# Patient Record
Sex: FEMALE | Race: WHITE | Employment: OTHER | URBAN - METROPOLITAN AREA
[De-identification: names, ages, dates, MRNs, and addresses within clinical notes are randomized per-mention and may not be internally consistent; named-entity substitution may affect disease eponyms.]

---

## 2018-05-29 ENCOUNTER — HOSPITAL ENCOUNTER (OUTPATIENT)
Age: 68
Discharge: HOME | End: 2018-05-29
Payer: COMMERCIAL

## 2018-05-29 LAB
ALBUMIN SERPL-MCNC: 4.3 G/DL (ref 3.5–5.2)
ALP BLD-CCNC: 83 U/L (ref 35–104)
ALT SERPL-CCNC: 14 U/L (ref 0–32)
ANION GAP SERPL CALCULATED.3IONS-SCNC: 9 MMOL/L (ref 7–16)
AST SERPL-CCNC: 17 U/L (ref 0–31)
BASOPHILS ABSOLUTE: 0.02 E9/L (ref 0–0.2)
BASOPHILS RELATIVE PERCENT: 0.4 % (ref 0–2)
BILIRUB SERPL-MCNC: 0.5 MG/DL (ref 0–1.2)
BUN BLDV-MCNC: 11 MG/DL (ref 8–23)
CALCIUM SERPL-MCNC: 9 MG/DL (ref 8.6–10.2)
CHLORIDE BLD-SCNC: 104 MMOL/L (ref 98–107)
CHOLESTEROL, FASTING: 206 MG/DL (ref 0–199)
CO2: 31 MMOL/L (ref 22–29)
CREAT SERPL-MCNC: 0.8 MG/DL (ref 0.5–1)
EOSINOPHILS ABSOLUTE: 0.19 E9/L (ref 0.05–0.5)
EOSINOPHILS RELATIVE PERCENT: 3.8 % (ref 0–6)
GFR AFRICAN AMERICAN: >60
GFR NON-AFRICAN AMERICAN: >60 ML/MIN/1.73
GLUCOSE FASTING: 97 MG/DL (ref 74–109)
HCT VFR BLD CALC: 39.5 % (ref 34–48)
HDLC SERPL-MCNC: 48 MG/DL
HEMOGLOBIN: 13.5 G/DL (ref 11.5–15.5)
IMMATURE GRANULOCYTES #: 0.01 E9/L
IMMATURE GRANULOCYTES %: 0.2 % (ref 0–5)
LDL CHOLESTEROL CALCULATED: 127 MG/DL (ref 0–99)
LYMPHOCYTES ABSOLUTE: 1.18 E9/L (ref 1.5–4)
LYMPHOCYTES RELATIVE PERCENT: 23.6 % (ref 20–42)
MCH RBC QN AUTO: 31.6 PG (ref 26–35)
MCHC RBC AUTO-ENTMCNC: 34.2 % (ref 32–34.5)
MCV RBC AUTO: 92.5 FL (ref 80–99.9)
MONOCYTES ABSOLUTE: 0.43 E9/L (ref 0.1–0.95)
MONOCYTES RELATIVE PERCENT: 8.6 % (ref 2–12)
NEUTROPHILS ABSOLUTE: 3.16 E9/L (ref 1.8–7.3)
NEUTROPHILS RELATIVE PERCENT: 63.4 % (ref 43–80)
PDW BLD-RTO: 12.2 FL (ref 11.5–15)
PLATELET # BLD: 171 E9/L (ref 130–450)
PMV BLD AUTO: 11.3 FL (ref 7–12)
POTASSIUM SERPL-SCNC: 4.9 MMOL/L (ref 3.5–5)
RBC # BLD: 4.27 E12/L (ref 3.5–5.5)
SODIUM BLD-SCNC: 144 MMOL/L (ref 132–146)
TOTAL PROTEIN: 6.7 G/DL (ref 6.4–8.3)
TRIGLYCERIDE, FASTING: 155 MG/DL (ref 0–149)
TSH SERPL DL<=0.05 MIU/L-ACNC: 0.27 UIU/ML (ref 0.27–4.2)
VITAMIN D 25-HYDROXY: 29 NG/ML (ref 30–100)
VLDLC SERPL CALC-MCNC: 31 MG/DL
WBC # BLD: 5 E9/L (ref 4.5–11.5)

## 2018-05-29 PROCEDURE — 84443 ASSAY THYROID STIM HORMONE: CPT

## 2018-05-29 PROCEDURE — 82306 VITAMIN D 25 HYDROXY: CPT

## 2018-05-29 PROCEDURE — 85025 COMPLETE CBC W/AUTO DIFF WBC: CPT

## 2018-05-29 PROCEDURE — 80053 COMPREHEN METABOLIC PANEL: CPT

## 2018-05-29 PROCEDURE — 80061 LIPID PANEL: CPT

## 2018-05-29 PROCEDURE — 36415 COLL VENOUS BLD VENIPUNCTURE: CPT

## 2018-06-04 ENCOUNTER — HOSPITAL ENCOUNTER (OUTPATIENT)
Age: 68
Discharge: HOME | End: 2018-06-06
Payer: COMMERCIAL

## 2018-06-04 ENCOUNTER — HOSPITAL ENCOUNTER (OUTPATIENT)
Dept: GENERAL RADIOLOGY | Age: 68
Discharge: HOME | End: 2018-06-06
Payer: COMMERCIAL

## 2018-06-04 DIAGNOSIS — M25.552 HIP PAIN, BILATERAL: ICD-10-CM

## 2018-06-04 DIAGNOSIS — M25.551 HIP PAIN, BILATERAL: ICD-10-CM

## 2018-06-04 DIAGNOSIS — M25.551 HIP PAIN, RIGHT: ICD-10-CM

## 2018-06-04 PROCEDURE — 73521 X-RAY EXAM HIPS BI 2 VIEWS: CPT

## 2018-09-24 ENCOUNTER — HOSPITAL ENCOUNTER (OUTPATIENT)
Age: 68
Discharge: HOME | End: 2018-09-26
Payer: COMMERCIAL

## 2018-09-24 ENCOUNTER — HOSPITAL ENCOUNTER (OUTPATIENT)
Age: 68
End: 2018-09-24
Payer: COMMERCIAL

## 2018-09-24 ENCOUNTER — HOSPITAL ENCOUNTER (OUTPATIENT)
Dept: GENERAL RADIOLOGY | Age: 68
Discharge: HOME | End: 2018-09-26
Payer: COMMERCIAL

## 2018-09-24 DIAGNOSIS — R05.9 COUGH: ICD-10-CM

## 2018-09-24 PROCEDURE — 71046 X-RAY EXAM CHEST 2 VIEWS: CPT

## 2018-11-09 ENCOUNTER — HOSPITAL ENCOUNTER (OUTPATIENT)
Age: 68
Discharge: HOME | End: 2018-11-09
Payer: COMMERCIAL

## 2018-11-14 ENCOUNTER — HOSPITAL ENCOUNTER (OUTPATIENT)
Age: 68
Setting detail: SPECIMEN
Discharge: HOME | End: 2018-11-14
Payer: COMMERCIAL

## 2018-11-14 PROCEDURE — 82533 TOTAL CORTISOL: CPT

## 2018-11-18 LAB
Lab: NORMAL
Lab: NORMAL
REPORT: NORMAL
REPORT: NORMAL
THIS TEST SENT TO: NORMAL
THIS TEST SENT TO: NORMAL

## 2019-03-29 ENCOUNTER — HOSPITAL ENCOUNTER (OUTPATIENT)
Age: 69
Discharge: HOME | End: 2019-03-31

## 2019-03-29 PROCEDURE — 88342 IMHCHEM/IMCYTCHM 1ST ANTB: CPT

## 2019-03-29 PROCEDURE — 88305 TISSUE EXAM BY PATHOLOGIST: CPT

## 2019-07-09 ENCOUNTER — HOSPITAL ENCOUNTER (OUTPATIENT)
Age: 69
Discharge: HOME | End: 2019-07-09
Payer: MEDICARE

## 2019-07-09 LAB
ALBUMIN SERPL-MCNC: 4.3 G/DL (ref 3.5–5.2)
ALP BLD-CCNC: 77 U/L (ref 35–104)
ALT SERPL-CCNC: 10 U/L (ref 0–32)
ANION GAP SERPL CALCULATED.3IONS-SCNC: 12 MMOL/L (ref 7–16)
AST SERPL-CCNC: 19 U/L (ref 0–31)
BACTERIA: ABNORMAL /HPF
BASOPHILS ABSOLUTE: 0.03 E9/L (ref 0–0.2)
BASOPHILS RELATIVE PERCENT: 0.6 % (ref 0–2)
BILIRUB SERPL-MCNC: 0.3 MG/DL (ref 0–1.2)
BILIRUBIN URINE: NEGATIVE
BLOOD, URINE: ABNORMAL
BUN BLDV-MCNC: 12 MG/DL (ref 8–23)
CALCIUM SERPL-MCNC: 9.2 MG/DL (ref 8.6–10.2)
CHLORIDE BLD-SCNC: 107 MMOL/L (ref 98–107)
CHOLESTEROL, FASTING: 225 MG/DL (ref 0–199)
CLARITY: CLEAR
CO2: 27 MMOL/L (ref 22–29)
COLOR: YELLOW
CREAT SERPL-MCNC: 0.9 MG/DL (ref 0.5–1)
EOSINOPHILS ABSOLUTE: 0.18 E9/L (ref 0.05–0.5)
EOSINOPHILS RELATIVE PERCENT: 3.3 % (ref 0–6)
GFR AFRICAN AMERICAN: >60
GFR NON-AFRICAN AMERICAN: >60 ML/MIN/1.73
GLUCOSE FASTING: 103 MG/DL (ref 74–99)
GLUCOSE URINE: NEGATIVE MG/DL
HCT VFR BLD CALC: 41.4 % (ref 34–48)
HDLC SERPL-MCNC: 45 MG/DL
HEMOGLOBIN: 13.5 G/DL (ref 11.5–15.5)
IMMATURE GRANULOCYTES #: 0.02 E9/L
IMMATURE GRANULOCYTES %: 0.4 % (ref 0–5)
KETONES, URINE: NEGATIVE MG/DL
LDL CHOLESTEROL CALCULATED: 148 MG/DL (ref 0–99)
LEUKOCYTE ESTERASE, URINE: ABNORMAL
LYMPHOCYTES ABSOLUTE: 1.29 E9/L (ref 1.5–4)
LYMPHOCYTES RELATIVE PERCENT: 23.7 % (ref 20–42)
MCH RBC QN AUTO: 30.5 PG (ref 26–35)
MCHC RBC AUTO-ENTMCNC: 32.6 % (ref 32–34.5)
MCV RBC AUTO: 93.7 FL (ref 80–99.9)
MONOCYTES ABSOLUTE: 0.48 E9/L (ref 0.1–0.95)
MONOCYTES RELATIVE PERCENT: 8.8 % (ref 2–12)
NEUTROPHILS ABSOLUTE: 3.44 E9/L (ref 1.8–7.3)
NEUTROPHILS RELATIVE PERCENT: 63.2 % (ref 43–80)
NITRITE, URINE: NEGATIVE
PDW BLD-RTO: 12.3 FL (ref 11.5–15)
PH UA: 5.5 (ref 5–9)
PLATELET # BLD: 189 E9/L (ref 130–450)
PMV BLD AUTO: 11.4 FL (ref 7–12)
POTASSIUM SERPL-SCNC: 4.6 MMOL/L (ref 3.5–5)
PROTEIN UA: NEGATIVE MG/DL
RBC # BLD: 4.42 E12/L (ref 3.5–5.5)
RBC UA: ABNORMAL /HPF (ref 0–2)
SODIUM BLD-SCNC: 146 MMOL/L (ref 132–146)
SPECIFIC GRAVITY UA: 1.02 (ref 1–1.03)
TOTAL PROTEIN: 7 G/DL (ref 6.4–8.3)
TRIGLYCERIDE, FASTING: 159 MG/DL (ref 0–149)
TSH SERPL DL<=0.05 MIU/L-ACNC: 0.6 UIU/ML (ref 0.27–4.2)
UROBILINOGEN, URINE: 0.2 E.U./DL
VITAMIN D 25-HYDROXY: 34 NG/ML (ref 30–100)
VLDLC SERPL CALC-MCNC: 32 MG/DL
WBC # BLD: 5.4 E9/L (ref 4.5–11.5)
WBC UA: ABNORMAL /HPF (ref 0–5)

## 2019-07-09 PROCEDURE — 84443 ASSAY THYROID STIM HORMONE: CPT

## 2019-07-09 PROCEDURE — 80053 COMPREHEN METABOLIC PANEL: CPT

## 2019-07-09 PROCEDURE — 81001 URINALYSIS AUTO W/SCOPE: CPT

## 2019-07-09 PROCEDURE — 80061 LIPID PANEL: CPT

## 2019-07-09 PROCEDURE — 82306 VITAMIN D 25 HYDROXY: CPT

## 2019-07-09 PROCEDURE — 36415 COLL VENOUS BLD VENIPUNCTURE: CPT

## 2019-07-09 PROCEDURE — 85025 COMPLETE CBC W/AUTO DIFF WBC: CPT

## 2019-08-22 ENCOUNTER — APPOINTMENT (OUTPATIENT)
Dept: GENERAL RADIOLOGY | Age: 69
End: 2019-08-22
Payer: MEDICARE

## 2019-08-22 ENCOUNTER — HOSPITAL ENCOUNTER (EMERGENCY)
Age: 69
Discharge: HOME | End: 2019-08-22
Payer: MEDICARE

## 2019-08-22 VITALS
OXYGEN SATURATION: 96 % | DIASTOLIC BLOOD PRESSURE: 88 MMHG | HEIGHT: 64 IN | BODY MASS INDEX: 25.61 KG/M2 | SYSTOLIC BLOOD PRESSURE: 102 MMHG | HEART RATE: 56 BPM | WEIGHT: 150 LBS | TEMPERATURE: 97.5 F | RESPIRATION RATE: 20 BRPM

## 2019-08-22 DIAGNOSIS — M54.32 SCIATICA OF LEFT SIDE: Primary | ICD-10-CM

## 2019-08-22 PROCEDURE — 73502 X-RAY EXAM HIP UNI 2-3 VIEWS: CPT

## 2019-08-22 PROCEDURE — 99283 EMERGENCY DEPT VISIT LOW MDM: CPT

## 2019-08-22 PROCEDURE — 72100 X-RAY EXAM L-S SPINE 2/3 VWS: CPT

## 2019-08-22 RX ORDER — METOPROLOL SUCCINATE 25 MG/1
25 TABLET, EXTENDED RELEASE ORAL 2 TIMES DAILY
COMMUNITY

## 2019-08-22 RX ORDER — HYDROCODONE BITARTRATE AND ACETAMINOPHEN 5; 325 MG/1; MG/1
1 TABLET ORAL EVERY 6 HOURS PRN
Qty: 12 TABLET | Refills: 0 | Status: SHIPPED | OUTPATIENT
Start: 2019-08-22 | End: 2019-08-25

## 2019-08-22 RX ORDER — PREDNISONE 10 MG/1
TABLET ORAL
Qty: 20 TABLET | Refills: 0 | Status: SHIPPED | OUTPATIENT
Start: 2019-08-22 | End: 2019-09-01

## 2019-08-22 ASSESSMENT — PAIN DESCRIPTION - ORIENTATION: ORIENTATION: LEFT

## 2019-08-22 ASSESSMENT — PAIN SCALES - GENERAL: PAINLEVEL_OUTOF10: 2

## 2019-08-22 ASSESSMENT — PAIN DESCRIPTION - LOCATION: LOCATION: HIP

## 2019-08-22 NOTE — ED NOTES
Pt is alert color is good and skin is warm and dry resp are non labored to x ray via cart.      Chris Sands RN  08/22/19 3897

## 2019-09-06 ENCOUNTER — HOSPITAL ENCOUNTER (EMERGENCY)
Age: 69
Discharge: HOME | End: 2019-09-06
Payer: MEDICARE

## 2019-09-06 ENCOUNTER — APPOINTMENT (OUTPATIENT)
Dept: GENERAL RADIOLOGY | Age: 69
End: 2019-09-06
Payer: MEDICARE

## 2019-09-06 VITALS
DIASTOLIC BLOOD PRESSURE: 73 MMHG | WEIGHT: 150 LBS | HEIGHT: 64 IN | TEMPERATURE: 97.6 F | HEART RATE: 56 BPM | RESPIRATION RATE: 16 BRPM | BODY MASS INDEX: 25.61 KG/M2 | OXYGEN SATURATION: 98 % | SYSTOLIC BLOOD PRESSURE: 149 MMHG

## 2019-09-06 DIAGNOSIS — M72.2 PLANTAR FASCIITIS OF RIGHT FOOT: Primary | ICD-10-CM

## 2019-09-06 PROCEDURE — 99283 EMERGENCY DEPT VISIT LOW MDM: CPT

## 2019-09-06 PROCEDURE — 73630 X-RAY EXAM OF FOOT: CPT

## 2019-09-06 RX ORDER — NAPROXEN 500 MG/1
500 TABLET ORAL 2 TIMES DAILY
Qty: 30 TABLET | Refills: 0 | Status: SHIPPED | OUTPATIENT
Start: 2019-09-06 | End: 2019-09-21

## 2019-09-06 ASSESSMENT — PAIN SCALES - GENERAL: PAINLEVEL_OUTOF10: 7

## 2019-09-06 ASSESSMENT — PAIN DESCRIPTION - ORIENTATION: ORIENTATION: RIGHT

## 2019-09-06 ASSESSMENT — PAIN DESCRIPTION - DESCRIPTORS: DESCRIPTORS: SHARP;SHOOTING

## 2019-09-06 ASSESSMENT — PAIN DESCRIPTION - LOCATION: LOCATION: ANKLE;FOOT

## 2019-09-06 ASSESSMENT — PAIN DESCRIPTION - PAIN TYPE: TYPE: ACUTE PAIN

## 2020-02-11 ENCOUNTER — APPOINTMENT (OUTPATIENT)
Dept: GENERAL RADIOLOGY | Age: 70
End: 2020-02-11
Payer: MEDICARE

## 2020-02-11 ENCOUNTER — HOSPITAL ENCOUNTER (EMERGENCY)
Age: 70
Discharge: HOME | End: 2020-02-11
Payer: MEDICARE

## 2020-02-11 VITALS
BODY MASS INDEX: 23.9 KG/M2 | TEMPERATURE: 98.4 F | OXYGEN SATURATION: 98 % | RESPIRATION RATE: 16 BRPM | HEIGHT: 64 IN | SYSTOLIC BLOOD PRESSURE: 155 MMHG | WEIGHT: 140 LBS | HEART RATE: 58 BPM | DIASTOLIC BLOOD PRESSURE: 75 MMHG

## 2020-02-11 PROCEDURE — 99283 EMERGENCY DEPT VISIT LOW MDM: CPT

## 2020-02-11 PROCEDURE — 73070 X-RAY EXAM OF ELBOW: CPT

## 2020-02-11 ASSESSMENT — PAIN SCALES - GENERAL: PAINLEVEL_OUTOF10: 6

## 2020-02-11 NOTE — ED PROVIDER NOTES
Swelling: None. Deformity: no.              ROM: full range of motion. Skin:  no erythema, rash or wounds noted. Neurovascular             Motor deficit: none. Sensory deficit: no.              Pulse deficit: no.  2+ radial pulse of the left wrist.            Capillary refill: normal.  Shoulder:              Tenderness:  none. Swelling: None. Deformity: no.             ROM: full range of motion. Skin:  no erythema, rash or wounds noted. Wrist:               Tenderness:  none. Swelling: None. Deformity: no.             ROM: full range of motion. Skin:  no erythema, rash or wounds noted. Lymphatics: No lymphangitis or adenopathy noted. Neurological:  Oriented. Motor functions intact. Lab / Imaging Results   (All laboratory and radiology results have been personally reviewed by myself)  Labs:  No results found for this visit on 02/11/20. Imaging: All Radiology results interpreted by Radiologist unless otherwise noted. XR ELBOW LEFT (2 VIEWS)   Final Result   No acute findings. ED Course / Medical Decision Making   Medications - No data to display     Re-examination:  2/11/20       Time: 8111   Updated on results. Consult(s):   None    Procedure(s):   none    MDM:   No acute fracture dislocation seen on x-ray today. Patient is full active range of motion, good peripheral pulse. Will discharge home at this time. Patient is encouraged to return to the ER for any worsening symptoms. Otherwise to follow-up with PCP. Counseling: The emergency provider has spoken with the patient and discussed todays results, in addition to providing specific details for the plan of care and counseling regarding the diagnosis and prognosis. Questions are answered at this time and they are agreeable with the plan. Assessment      1.  Contusion of left elbow, initial encounter      Plan Discharge to home  Patient condition is good    New Medications     New Prescriptions    No medications on file     Electronically signed by KIZZY Lehman   DD: 2/11/20  **This report was transcribed using voice recognition software. Every effort was made to ensure accuracy; however, inadvertent computerized transcription errors may be present.   END OF ED PROVIDER NOTE       Marisel Morgan, [id number]  02/11/20 [id number]

## 2020-08-10 ENCOUNTER — HOSPITAL ENCOUNTER (OUTPATIENT)
Age: 70
Discharge: HOME | End: 2020-08-10
Payer: MEDICARE

## 2020-08-10 LAB
ALBUMIN SERPL-MCNC: 4 G/DL (ref 3.5–5.2)
ALP BLD-CCNC: 58 U/L (ref 35–104)
ALT SERPL-CCNC: 15 U/L (ref 0–32)
ANION GAP SERPL CALCULATED.3IONS-SCNC: 11 MMOL/L (ref 7–16)
AST SERPL-CCNC: 17 U/L (ref 0–31)
BASOPHILS ABSOLUTE: 0.02 E9/L (ref 0–0.2)
BASOPHILS RELATIVE PERCENT: 0.4 % (ref 0–2)
BILIRUB SERPL-MCNC: 0.4 MG/DL (ref 0–1.2)
BUN BLDV-MCNC: 15 MG/DL (ref 8–23)
CALCIUM SERPL-MCNC: 9 MG/DL (ref 8.6–10.2)
CHLORIDE BLD-SCNC: 107 MMOL/L (ref 98–107)
CHOLESTEROL, FASTING: 179 MG/DL (ref 0–199)
CO2: 28 MMOL/L (ref 22–29)
CREAT SERPL-MCNC: 0.9 MG/DL (ref 0.5–1)
EOSINOPHILS ABSOLUTE: 0.1 E9/L (ref 0.05–0.5)
EOSINOPHILS RELATIVE PERCENT: 2.1 % (ref 0–6)
GFR AFRICAN AMERICAN: >60
GFR NON-AFRICAN AMERICAN: >60 ML/MIN/1.73
GLUCOSE FASTING: 93 MG/DL (ref 74–99)
HCT VFR BLD CALC: 42.6 % (ref 34–48)
HDLC SERPL-MCNC: 46 MG/DL
HEMOGLOBIN: 13.5 G/DL (ref 11.5–15.5)
IMMATURE GRANULOCYTES #: 0.01 E9/L
IMMATURE GRANULOCYTES %: 0.2 % (ref 0–5)
LDL CHOLESTEROL CALCULATED: 98 MG/DL (ref 0–99)
LYMPHOCYTES ABSOLUTE: 0.99 E9/L (ref 1.5–4)
LYMPHOCYTES RELATIVE PERCENT: 21.2 % (ref 20–42)
MCH RBC QN AUTO: 31 PG (ref 26–35)
MCHC RBC AUTO-ENTMCNC: 31.7 % (ref 32–34.5)
MCV RBC AUTO: 97.9 FL (ref 80–99.9)
MONOCYTES ABSOLUTE: 0.44 E9/L (ref 0.1–0.95)
MONOCYTES RELATIVE PERCENT: 9.4 % (ref 2–12)
NEUTROPHILS ABSOLUTE: 3.12 E9/L (ref 1.8–7.3)
NEUTROPHILS RELATIVE PERCENT: 66.7 % (ref 43–80)
PDW BLD-RTO: 12.4 FL (ref 11.5–15)
PLATELET # BLD: 159 E9/L (ref 130–450)
PMV BLD AUTO: 11.8 FL (ref 7–12)
POTASSIUM SERPL-SCNC: 4.7 MMOL/L (ref 3.5–5)
RBC # BLD: 4.35 E12/L (ref 3.5–5.5)
SODIUM BLD-SCNC: 146 MMOL/L (ref 132–146)
TOTAL PROTEIN: 6.5 G/DL (ref 6.4–8.3)
TRIGLYCERIDE, FASTING: 174 MG/DL (ref 0–149)
TSH SERPL DL<=0.05 MIU/L-ACNC: 0.16 UIU/ML (ref 0.27–4.2)
VITAMIN D 25-HYDROXY: 58 NG/ML (ref 30–100)
VLDLC SERPL CALC-MCNC: 35 MG/DL
WBC # BLD: 4.7 E9/L (ref 4.5–11.5)

## 2020-08-10 PROCEDURE — 80061 LIPID PANEL: CPT

## 2020-08-10 PROCEDURE — 85025 COMPLETE CBC W/AUTO DIFF WBC: CPT

## 2020-08-10 PROCEDURE — 84443 ASSAY THYROID STIM HORMONE: CPT

## 2020-08-10 PROCEDURE — 82306 VITAMIN D 25 HYDROXY: CPT

## 2020-08-10 PROCEDURE — 86769 SARS-COV-2 COVID-19 ANTIBODY: CPT

## 2020-08-10 PROCEDURE — 80053 COMPREHEN METABOLIC PANEL: CPT

## 2020-08-10 PROCEDURE — 36415 COLL VENOUS BLD VENIPUNCTURE: CPT

## 2020-08-11 LAB — SARS-COV-2 ANTIBODY, TOTAL: NORMAL

## 2021-06-09 ENCOUNTER — HOSPITAL ENCOUNTER (OUTPATIENT)
Age: 71
Discharge: HOME | End: 2021-06-09
Payer: MEDICARE

## 2021-06-09 LAB
ALBUMIN SERPL-MCNC: 4.5 G/DL (ref 3.5–5.2)
ALP BLD-CCNC: 79 U/L (ref 35–104)
ALT SERPL-CCNC: 15 U/L (ref 0–32)
ANION GAP SERPL CALCULATED.3IONS-SCNC: 9 MMOL/L (ref 7–16)
AST SERPL-CCNC: 22 U/L (ref 0–31)
BASOPHILS ABSOLUTE: 0.04 E9/L (ref 0–0.2)
BASOPHILS RELATIVE PERCENT: 0.7 % (ref 0–2)
BILIRUB SERPL-MCNC: 0.6 MG/DL (ref 0–1.2)
BUN BLDV-MCNC: 13 MG/DL (ref 6–23)
CALCIUM SERPL-MCNC: 9.4 MG/DL (ref 8.6–10.2)
CHLORIDE BLD-SCNC: 104 MMOL/L (ref 98–107)
CHOLESTEROL, FASTING: 169 MG/DL (ref 0–199)
CO2: 30 MMOL/L (ref 22–29)
CREAT SERPL-MCNC: 1 MG/DL (ref 0.5–1)
EOSINOPHILS ABSOLUTE: 0.28 E9/L (ref 0.05–0.5)
EOSINOPHILS RELATIVE PERCENT: 4.8 % (ref 0–6)
GFR AFRICAN AMERICAN: >60
GFR NON-AFRICAN AMERICAN: 55 ML/MIN/1.73
GLUCOSE FASTING: 98 MG/DL (ref 74–99)
HCT VFR BLD CALC: 41.4 % (ref 34–48)
HDLC SERPL-MCNC: 46 MG/DL
HEMOGLOBIN: 13.8 G/DL (ref 11.5–15.5)
IMMATURE GRANULOCYTES #: 0.02 E9/L
IMMATURE GRANULOCYTES %: 0.3 % (ref 0–5)
LDL CHOLESTEROL CALCULATED: 83 MG/DL (ref 0–99)
LYMPHOCYTES ABSOLUTE: 1.14 E9/L (ref 1.5–4)
LYMPHOCYTES RELATIVE PERCENT: 19.5 % (ref 20–42)
MCH RBC QN AUTO: 31 PG (ref 26–35)
MCHC RBC AUTO-ENTMCNC: 33.3 % (ref 32–34.5)
MCV RBC AUTO: 93 FL (ref 80–99.9)
MONOCYTES ABSOLUTE: 0.47 E9/L (ref 0.1–0.95)
MONOCYTES RELATIVE PERCENT: 8 % (ref 2–12)
NEUTROPHILS ABSOLUTE: 3.91 E9/L (ref 1.8–7.3)
NEUTROPHILS RELATIVE PERCENT: 66.7 % (ref 43–80)
PDW BLD-RTO: 12.2 FL (ref 11.5–15)
PLATELET # BLD: 176 E9/L (ref 130–450)
PMV BLD AUTO: 11.8 FL (ref 7–12)
POTASSIUM SERPL-SCNC: 4.6 MMOL/L (ref 3.5–5)
RBC # BLD: 4.45 E12/L (ref 3.5–5.5)
SARS-COV-2 ANTIBODY, TOTAL: REACTIVE
SODIUM BLD-SCNC: 143 MMOL/L (ref 132–146)
TOTAL PROTEIN: 7 G/DL (ref 6.4–8.3)
TRIGLYCERIDE, FASTING: 200 MG/DL (ref 0–149)
TSH SERPL DL<=0.05 MIU/L-ACNC: 1.25 UIU/ML (ref 0.27–4.2)
VITAMIN D 25-HYDROXY: 80 NG/ML (ref 30–100)
VLDLC SERPL CALC-MCNC: 40 MG/DL
WBC # BLD: 5.9 E9/L (ref 4.5–11.5)

## 2021-06-09 PROCEDURE — 87088 URINE BACTERIA CULTURE: CPT

## 2021-06-09 PROCEDURE — 82306 VITAMIN D 25 HYDROXY: CPT

## 2021-06-09 PROCEDURE — 36415 COLL VENOUS BLD VENIPUNCTURE: CPT

## 2021-06-09 PROCEDURE — 86769 SARS-COV-2 COVID-19 ANTIBODY: CPT

## 2021-06-09 PROCEDURE — 85025 COMPLETE CBC W/AUTO DIFF WBC: CPT

## 2021-06-09 PROCEDURE — 80053 COMPREHEN METABOLIC PANEL: CPT

## 2021-06-09 PROCEDURE — 84443 ASSAY THYROID STIM HORMONE: CPT

## 2021-06-09 PROCEDURE — 80061 LIPID PANEL: CPT

## 2021-06-11 LAB — URINE CULTURE, ROUTINE: NORMAL

## 2021-07-16 ENCOUNTER — HOSPITAL ENCOUNTER (OUTPATIENT)
Dept: GENERAL RADIOLOGY | Age: 71
Discharge: HOME | End: 2021-07-18
Payer: MEDICARE

## 2021-07-16 ENCOUNTER — HOSPITAL ENCOUNTER (OUTPATIENT)
Age: 71
Discharge: HOME | End: 2021-07-18
Payer: MEDICARE

## 2021-07-16 DIAGNOSIS — R52 PAIN: ICD-10-CM

## 2021-07-16 DIAGNOSIS — R06.02 SOB (SHORTNESS OF BREATH): ICD-10-CM

## 2021-07-16 PROCEDURE — 71046 X-RAY EXAM CHEST 2 VIEWS: CPT

## 2021-07-16 PROCEDURE — 73521 X-RAY EXAM HIPS BI 2 VIEWS: CPT

## 2021-07-16 PROCEDURE — 72100 X-RAY EXAM L-S SPINE 2/3 VWS: CPT

## 2024-08-06 PROBLEM — I10 HYPERTENSION: Status: ACTIVE | Noted: 2024-08-06

## 2024-08-07 ENCOUNTER — OFFICE VISIT (OUTPATIENT)
Dept: CARDIOLOGY CLINIC | Age: 74
End: 2024-08-07

## 2024-08-07 VITALS
WEIGHT: 146.8 LBS | HEIGHT: 64 IN | BODY MASS INDEX: 25.06 KG/M2 | RESPIRATION RATE: 16 BRPM | SYSTOLIC BLOOD PRESSURE: 137 MMHG | DIASTOLIC BLOOD PRESSURE: 86 MMHG | HEART RATE: 62 BPM

## 2024-08-07 DIAGNOSIS — I10 HYPERTENSION, UNSPECIFIED TYPE: Primary | ICD-10-CM

## 2024-08-07 DIAGNOSIS — E78.5 HYPERLIPIDEMIA, UNSPECIFIED HYPERLIPIDEMIA TYPE: ICD-10-CM

## 2024-08-07 PROBLEM — R07.89 OTHER CHEST PAIN: Status: RESOLVED | Noted: 2024-08-07 | Resolved: 2024-08-07

## 2024-08-07 PROBLEM — R07.89 OTHER CHEST PAIN: Status: ACTIVE | Noted: 2024-08-07

## 2024-08-07 RX ORDER — PITAVASTATIN CALCIUM 4.18 MG/1
1 TABLET, FILM COATED ORAL EVERY OTHER DAY
COMMUNITY

## 2024-08-07 RX ORDER — LORAZEPAM 0.5 MG/1
1 TABLET ORAL 2 TIMES DAILY PRN
COMMUNITY

## 2024-08-07 RX ORDER — LOSARTAN POTASSIUM 50 MG/1
50 TABLET ORAL DAILY
COMMUNITY

## 2024-08-07 NOTE — PROGRESS NOTES
Chief Complaint   Patient presents with    Heart check        Patient Active Problem List    Diagnosis Date Noted    Other chest pain 08/07/2024    HLD (hyperlipidemia) 08/07/2024    Hypertension 08/06/2024       Current Outpatient Medications   Medication Sig Dispense Refill    pitavastatin (LIVALO) 4 MG TABS tablet Take 1 tablet by mouth every other day      losartan (COZAAR) 50 MG tablet Take 1 tablet by mouth daily      LORazepam (ATIVAN) 0.5 MG tablet Take 1 tablet by mouth 2 times daily as needed.      ibuprofen (ADVIL;MOTRIN) 800 MG tablet Take 1 tablet by mouth every 8 hours as needed for Pain 30 tablet 0    levothyroxine (SYNTHROID) 88 MCG tablet Take 1 tablet by mouth Daily       No current facility-administered medications for this visit.        Allergies   Allergen Reactions    Prednisolone Other (See Comments)       Vitals:    08/07/24 0750   BP: 137/86   Pulse: 62   Resp: 16   Weight: 66.6 kg (146 lb 12.8 oz)   Height: 1.626 m (5' 4\")                 SUBJECTIVE: Elsi Kristopher presents to the office today for consult - dr madrigal  No cardiac hx, but did see dr washington on a regular basis for stress testing ,etc, all with no diagnosis     She complains of  no cardiac symptoms   and denies   dyspnea, exertional chest pressure/discomfort, fatigue, irregular heart beat, lower extremity edema, near-syncope, orthopnea, palpitations, paroxysmal nocturnal dyspnea, syncope, and tachypnea.  Works at [employer], on her feet all day, carrying objects as well, does all AODLs at home as well  HTN and HLD managed by PCP          Physical Exam   /86   Pulse 62   Resp 16   Ht 1.626 m (5' 4\")   Wt 66.6 kg (146 lb 12.8 oz)   BMI 25.20 kg/m²   Constitutional: Oriented to person, place, and time. Well-developed and well-nourished. No distress.    Neck: No JVD present. Carotid bruit is not present.   Cardiovascular: Normal rate, regular rhythm, normal heart sounds and intact distal pulses.  No gallop and no

## 2025-02-14 ENCOUNTER — TRANSCRIBE ORDERS (OUTPATIENT)
Dept: ADMINISTRATIVE | Age: 75
End: 2025-02-14

## 2025-02-14 DIAGNOSIS — R31.21 ASYMPTOMATIC MICROSCOPIC HEMATURIA: Primary | ICD-10-CM

## 2025-03-14 ENCOUNTER — HOSPITAL ENCOUNTER (OUTPATIENT)
Dept: ULTRASOUND IMAGING | Age: 75
Discharge: HOME | End: 2025-03-16
Attending: STUDENT IN AN ORGANIZED HEALTH CARE EDUCATION/TRAINING PROGRAM
Payer: MEDICARE

## 2025-03-14 ENCOUNTER — HOSPITAL ENCOUNTER (OUTPATIENT)
Dept: GENERAL RADIOLOGY | Age: 75
Discharge: HOME | End: 2025-03-16
Attending: STUDENT IN AN ORGANIZED HEALTH CARE EDUCATION/TRAINING PROGRAM
Payer: MEDICARE

## 2025-03-14 ENCOUNTER — HOSPITAL ENCOUNTER (OUTPATIENT)
Age: 75
Discharge: HOME | End: 2025-03-16
Attending: STUDENT IN AN ORGANIZED HEALTH CARE EDUCATION/TRAINING PROGRAM
Payer: MEDICARE

## 2025-03-14 DIAGNOSIS — R31.21 ASYMPTOMATIC MICROSCOPIC HEMATURIA: ICD-10-CM

## 2025-03-14 PROCEDURE — 74018 RADEX ABDOMEN 1 VIEW: CPT

## 2025-03-14 PROCEDURE — 76770 US EXAM ABDO BACK WALL COMP: CPT

## 2025-05-05 ENCOUNTER — APPOINTMENT (OUTPATIENT)
Dept: ULTRASOUND IMAGING | Age: 75
End: 2025-05-05
Payer: MEDICARE

## 2025-05-05 ENCOUNTER — HOSPITAL ENCOUNTER (EMERGENCY)
Age: 75
Discharge: HOME | End: 2025-05-05
Payer: MEDICARE

## 2025-05-05 ENCOUNTER — APPOINTMENT (OUTPATIENT)
Dept: GENERAL RADIOLOGY | Age: 75
End: 2025-05-05
Payer: MEDICARE

## 2025-05-05 VITALS
RESPIRATION RATE: 18 BRPM | OXYGEN SATURATION: 99 % | WEIGHT: 140 LBS | HEART RATE: 69 BPM | SYSTOLIC BLOOD PRESSURE: 141 MMHG | DIASTOLIC BLOOD PRESSURE: 88 MMHG | BODY MASS INDEX: 23.9 KG/M2 | HEIGHT: 64 IN | TEMPERATURE: 98.1 F

## 2025-05-05 DIAGNOSIS — S93.401A SPRAIN OF RIGHT ANKLE, UNSPECIFIED LIGAMENT, INITIAL ENCOUNTER: Primary | ICD-10-CM

## 2025-05-05 PROCEDURE — 73610 X-RAY EXAM OF ANKLE: CPT

## 2025-05-05 PROCEDURE — 93971 EXTREMITY STUDY: CPT

## 2025-05-05 PROCEDURE — 99284 EMERGENCY DEPT VISIT MOD MDM: CPT

## 2025-05-05 ASSESSMENT — PAIN SCALES - GENERAL: PAINLEVEL_OUTOF10: 7

## 2025-05-05 ASSESSMENT — LIFESTYLE VARIABLES
HOW OFTEN DO YOU HAVE A DRINK CONTAINING ALCOHOL: MONTHLY OR LESS
HOW MANY STANDARD DRINKS CONTAINING ALCOHOL DO YOU HAVE ON A TYPICAL DAY: 1 OR 2

## 2025-05-05 ASSESSMENT — PAIN DESCRIPTION - ORIENTATION: ORIENTATION: RIGHT

## 2025-05-05 ASSESSMENT — PAIN DESCRIPTION - DESCRIPTORS: DESCRIPTORS: SORE

## 2025-05-05 ASSESSMENT — PAIN DESCRIPTION - LOCATION: LOCATION: LEG

## 2025-05-05 ASSESSMENT — PAIN - FUNCTIONAL ASSESSMENT: PAIN_FUNCTIONAL_ASSESSMENT: ACTIVITIES ARE NOT PREVENTED

## 2025-05-05 NOTE — ED TRIAGE NOTES
FIRST PROVIDER CONTACT ASSESSMENT NOTE       Department of Emergency Medicine                 First Provider Note            25  5:37 PM EDT    Date of Encounter: No admission date for patient encounter.    Patient Name: Elsi Summers  : 1950  MRN: [id number]    Chief Complaint: Leg Swelling (No injury; started today)      History of Present Illness:   Elsi Summers is a 74 y.o. female who presents to the ED for right leg swelling.   Denies fall or injury.   Mild pain.     Started just prior to arrival.   No hx of blood clots.        Focused Physical Exam:  VS:    ED Triage Vitals   BP Systolic BP Percentile Diastolic BP Percentile Temp Temp src Pulse Resp SpO2   -- -- -- -- -- -- -- --      Height Weight         -- --              Physical Ex: Constitutional: Alert and non-toxic.    Medical History:  has a past medical history of Hyperlipidemia, Hypertension, and Thyroid disease.  Surgical History:  has a past surgical history that includes Breast surgery.  Social History:  reports that she has never smoked. She has never used smokeless tobacco. She reports that she does not drink alcohol and does not use drugs.  Family History: family history includes Alzheimer's Disease in her father; Heart Attack in her mother.    Allergies: Prednisolone     Initial Plan of Care: Initiate Treatment-Testing, Proceed toTreatment Area When Bed Available for ED Attending/MLP to Continue Care      ---END OF FIRST PROVIDER CONTACT ASSESSMENT NOTE---  Electronically signed by Amy Fritz PA-C   DD: 25

## 2025-05-06 NOTE — ED PROVIDER NOTES
Independent JALYN Visit.      [facility]  Department of Emergency Medicine   ED  Encounter Note  Admit Date/RoomTime: 2025  7:25 PM  ED Room: DISPO/D02    NAME: Elsi Summers  : 1950  MRN: [id number]     Chief Complaint:  Leg Swelling (No injury; started today)    History of Present Illness         Elsi Summers is a 74 y.o. old female presenting to the emergency department by private vehicle, for non-traumatic Right ankle and shin pain and swelling which occured a few hour(s) prior to arrival.  The complaint is due to no known cause and pt reports she was standing on her toes to reach something on a stool and stepped off the stool but did not really twist her ankle. She did feel pain.  Since onset the symptoms have been improving with ability to bear weight, but with some pain.  Pt has history of calcaneal fracture several years ago. Her pain is aggraveated by certain movements and relieved by motrin and elevation, she took 400 mg motrin prior to coming to ED.  She denies any chest pain, numbness, weakness, fever, chills, wounds, rash, or shortness of breath.  Tetanus Status: up to date.    ROS   Pertinent positives and negatives are stated within HPI, all other systems reviewed and are negative.    Past Medical History:  has a past medical history of Hyperlipidemia, Hypertension, and Thyroid disease.    Surgical History:  has a past surgical history that includes Breast surgery.    Social History:  reports that she has never smoked. She has never used smokeless tobacco. She reports that she does not drink alcohol and does not use drugs.    Family History: family history includes Alzheimer's Disease in her father; Heart Attack in her mother.     Allergies: Prednisolone    Physical Exam   Oxygen Saturation Interpretation: Normal.        ED Triage Vitals   BP Systolic BP Percentile Diastolic BP Percentile Temp Temp Source Pulse Respirations SpO2   25 1739 -- -- 25

## 2025-06-13 ENCOUNTER — OFFICE VISIT (OUTPATIENT)
Age: 75
End: 2025-06-13

## 2025-06-13 DIAGNOSIS — M70.62 GREATER TROCHANTERIC BURSITIS OF LEFT HIP: Primary | ICD-10-CM

## 2025-06-13 RX ORDER — BETAMETHASONE SODIUM PHOSPHATE AND BETAMETHASONE ACETATE 3; 3 MG/ML; MG/ML
6 INJECTION, SUSPENSION INTRA-ARTICULAR; INTRALESIONAL; INTRAMUSCULAR; SOFT TISSUE ONCE
Status: COMPLETED | OUTPATIENT
Start: 2025-06-13 | End: 2025-06-13

## 2025-06-13 RX ORDER — ATORVASTATIN CALCIUM 20 MG/1
20 TABLET, FILM COATED ORAL DAILY
COMMUNITY
Start: 2025-05-23

## 2025-06-13 RX ORDER — LIDOCAINE HYDROCHLORIDE 10 MG/ML
4 INJECTION, SOLUTION INFILTRATION; PERINEURAL ONCE
Status: COMPLETED | OUTPATIENT
Start: 2025-06-13 | End: 2025-06-13

## 2025-06-13 RX ORDER — AMLODIPINE BESYLATE 10 MG/1
10 TABLET ORAL DAILY
COMMUNITY
Start: 2024-06-20

## 2025-06-13 RX ADMIN — LIDOCAINE HYDROCHLORIDE 4 ML: 10 INJECTION, SOLUTION INFILTRATION; PERINEURAL at 09:14

## 2025-06-13 RX ADMIN — BETAMETHASONE SODIUM PHOSPHATE AND BETAMETHASONE ACETATE 6 MG: 3; 3 INJECTION, SUSPENSION INTRA-ARTICULAR; INTRALESIONAL; INTRAMUSCULAR; SOFT TISSUE at 09:15

## 2025-06-13 NOTE — PROGRESS NOTES
Chief Complaint   Patient presents with    Follow-up     Patient presents into the office today for a follow-up of the L hip. She states the injection she received 2 years ago provides relief for the full 2 years.     Hip Pain     Patient describes the pain as sharp / aching / burning when walking / sitting. She treats the pain with Tylenol / Motrin as needed. Patient rates the level of pin at a 7 / 10        Elsi Summers  Is a 74 y.o. female  is following up today with left hip pain. Patient states pain level is a Pain Score: SEVEN  .   She has tried occasional corticosteroid injections with good success.    Past Medical History:   Diagnosis Date    Hyperlipidemia     Hypertension     Thyroid disease      Past Surgical History:   Procedure Laterality Date    BREAST SURGERY         Current Outpatient Medications:     amLODIPine (NORVASC) 10 MG tablet, Take 1 tablet by mouth daily, Disp: , Rfl:     atorvastatin (LIPITOR) 20 MG tablet, Take 1 tablet by mouth daily, Disp: , Rfl:     tiZANidine (ZANAFLEX) 4 MG tablet, Take 1 tablet by mouth nightly, Disp: , Rfl:     pitavastatin (LIVALO) 4 MG TABS tablet, Take 1 tablet by mouth every other day, Disp: , Rfl:     losartan (COZAAR) 50 MG tablet, Take 1 tablet by mouth daily, Disp: , Rfl:     LORazepam (ATIVAN) 0.5 MG tablet, Take 1 tablet by mouth 2 times daily as needed., Disp: , Rfl:     ibuprofen (ADVIL;MOTRIN) 800 MG tablet, Take 1 tablet by mouth every 8 hours as needed for Pain, Disp: 30 tablet, Rfl: 0    levothyroxine (SYNTHROID) 88 MCG tablet, Take 1 tablet by mouth Daily, Disp: , Rfl:   Allergies   Allergen Reactions    Prednisolone Other (See Comments)     Social History     Socioeconomic History    Marital status:      Spouse name: Not on file    Number of children: Not on file    Years of education: Not on file    Highest education level: Not on file   Occupational History    Not on file   Tobacco Use    Smoking status: Never    Smokeless tobacco:

## 2025-07-10 PROCEDURE — 0753T DGTZ GLS MCRSCP SLD LEVEL IV: CPT

## 2025-07-10 PROCEDURE — 88305 TISSUE EXAM BY PATHOLOGIST: CPT

## 2025-07-10 PROCEDURE — 88305 TISSUE EXAM BY PATHOLOGIST: CPT | Performed by: STUDENT IN AN ORGANIZED HEALTH CARE EDUCATION/TRAINING PROGRAM

## 2025-07-14 ENCOUNTER — LAB REQUISITION (OUTPATIENT)
Dept: LAB | Facility: HOSPITAL | Age: 75
End: 2025-07-14
Payer: COMMERCIAL

## 2025-07-14 DIAGNOSIS — N81.3 COMPLETE UTEROVAGINAL PROLAPSE: ICD-10-CM

## 2025-07-17 LAB
LABORATORY COMMENT REPORT: NORMAL
PATH REPORT.FINAL DX SPEC: NORMAL
PATH REPORT.GROSS SPEC: NORMAL
PATH REPORT.RELEVANT HX SPEC: NORMAL
PATH REPORT.TOTAL CANCER: NORMAL